# Patient Record
Sex: MALE | Race: WHITE
[De-identification: names, ages, dates, MRNs, and addresses within clinical notes are randomized per-mention and may not be internally consistent; named-entity substitution may affect disease eponyms.]

---
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
## 2018-05-11 ENCOUNTER — HOSPITAL ENCOUNTER (EMERGENCY)
Dept: HOSPITAL 80 - FED | Age: 18
Discharge: HOME | End: 2018-05-11
Payer: COMMERCIAL

## 2018-05-11 VITALS — SYSTOLIC BLOOD PRESSURE: 126 MMHG | DIASTOLIC BLOOD PRESSURE: 80 MMHG
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
## 2018-05-11 DIAGNOSIS — S52.91XA: Primary | ICD-10-CM

## 2018-05-11 DIAGNOSIS — S52.202A: ICD-10-CM

## 2018-05-11 PROCEDURE — A4565 SLINGS: HCPCS

## 2018-05-11 NOTE — EDPHY
H & P


Stated Complaint: fell off the his skateboard right arm injury pt denies 

hitting head





- Personal History


Current Tetanus/Diphtheria Vaccine: Yes


Current Tetanus Diphtheria and Acellular Pertussis (TDAP): Yes





- Medical/Surgical History


Hx Asthma: No


Hx Chronic Respiratory Disease: No


Hx Diabetes: No


Hx Cardiac Disease: No


Hx Renal Disease: No


Hx Cirrhosis: No


Hx Alcoholism: No


Hx HIV/AIDS: No


Hx Splenectomy or Spleen Trauma: No


Other PMH: pmh:none.  psh:none





- Social History


Smoking Status: Never smoked


Time Seen by Provider: 05/11/18 19:45


HPI/ROS: 





Chief complaint:  Right forearm injury





History of present illness:  This is a 17-year-old male who presents to the 

emergency department with his family for a right forearm injury.  Just prior to 

arrival he was skateboarding when he fell off onto his right arm.  Since then 

he has had pain.  He has noticed deformity to the forearm.  It hurts to move 

the digits or wrist.  He denies other associated signs or symptoms including no 

open wounds.  No abnormal coolness or paresthesias in the arm.  No other 

injuries reported. (Ankush Ryan)





- Physical Exam


Exam: 





General:  Alert, nontoxic


Skin:  No open wounds to the right upper extremity


Musculoskeletal:  Tenderness to the mid forearm.  The elbow, wrist and digits 

are nontender.  It hurts to move the digits and wrist.  He can move the elbow.  

The upper arm is unremarkable.


Vascular:  Radial pulses 2+.  Cap refill brisk in all digits of the right hand.


Neurologic:  Sensation intact throughout the right arm. (Ankush Ryan)


Constitutional: 





 Initial Vital Signs











Temperature (C)  36.4 C   05/11/18 19:25


 


Heart Rate  89   05/11/18 19:25


 


Respiratory Rate  16   05/11/18 19:25


 


Blood Pressure  113/86 H  05/11/18 19:25


 


O2 Sat (%)  96   05/11/18 19:25








 











O2 Delivery Mode               Room Air














Allergies/Adverse Reactions: 


 





No Known Allergies Allergy (Verified 05/11/18 19:27)


 








Home Medications: 














 Medication  Instructions  Recorded


 


Hydrocodone/APAP 5/325 [Norco 1 tab PO Q4H #10 tab 05/11/18





5/325 (*)]  














Medical Decision Making





- Diagnostics


Imaging: I viewed and interpreted images myself


Procedures: 





Procedure:  Splint placement.





A sugar-tong splint was applied.  After application of the splint I returned 

and re-examined the patient.  The splint was adequately immobilizing the joint 

and distal to the splint the patient's circulation and sensation was intact.  

He was then placed in a sling.  He remained neurovascularly intact. (Ankush Ryan)


ED Course/Re-evaluation: 





Patient seen under the supervision of my secondary supervising physician Dr. Loli Cueva.  Patient presents for a right forearm injury.  The arm is 

neurovascularly intact.  X-ray confirms a mid shaft radius and ulna fracture.  

It is not open.  I have consulted with Orthopedics Dr. Monique Thorne who has 

reviewed the x-rays.  He is comfortable with patient being placed in a sugar-

tong splint and sling and following up in clinic.  This has occurred.  Pain 

management is discussed.  Home care is discussed.  They are referred to 

Orthopedics for further evaluation and care.  Return precautions are given. (

Ankush Ryan)


Differential Diagnosis: 





Included but not limited to contusion, sprain or strain, bony fracture, joint 

dislocation (Ankush Ryan)


Other Provider: 





The patient was evaluated and managed by the Physician Assistant.  I discussed 

the patient's presentation and course with the midlevel provider with them and 

agree with the evaluation.  My co-signature indicates that I have reviewed this 

chart and I agree with the findings and plan of care as documented.  I am the 

secondary supervising physician. (Loli Cueva)





- Data Points


Medications Given: 





 








Discontinued Medications





Hydrocodone Bitart/Acetaminophen (Norco 5/325)  1 tab PO EDNOW ONE


   Stop: 05/11/18 19:43


   Last Admin: 05/11/18 19:44 Dose:  1 tab


Hydrocodone Bitart/Acetaminophen (Norco 5/325mg Prepack#6)  1 btl TAKEHOME 

EDNOW ONE


   Stop: 05/11/18 20:48


   Last Admin: 05/11/18 21:01 Dose:  1 btl


Ibuprofen (Motrin)  600 mg PO EDNOW ONE


   Stop: 05/11/18 19:43


   Last Admin: 05/11/18 19:45 Dose:  600 mg








Departure





- Departure


Disposition: Home, Routine, Self-Care


Clinical Impression: 


Right forearm fracture


Qualifiers:


 Encounter type: initial encounter Fracture type: closed Qualified Code(s): 

S52.91XA - Unspecified fracture of right forearm, initial encounter for closed 

fracture





Condition: Good


Instructions:  Hydrocodone/Acetaminophen (By mouth), Arm Fracture in Adults (ED)


Additional Instructions: 


Please call on Monday and arrange a follow-up appointment with the orthopedic 

doctor for continued care





In regards to pain control see the following:


 Use ibuprofen [600] mg [3] times a day for the next 2-3 days for pain


 In addition You have been prescribed [Norco] for pain.  [Norco] contains 

Tylenol, do not take extra Tylenol/acetaminophen/Apap with it.  It is sedating.





If symptoms worsen or new symptoms develop return to the emergency room for 

recheck


Referrals: 


NONE *PRIMARY CARE P,. [Primary Care Provider] - As per Instructions


Monique Thorne MD [Medical Doctor] - As per Instructions


Prescriptions: 


Hydrocodone/APAP 5/325 [Norco 5/325 (*)] 1 tab PO Q4H #10 tab

## 2018-05-15 ENCOUNTER — HOSPITAL ENCOUNTER (OUTPATIENT)
Dept: HOSPITAL 80 - FSGY | Age: 18
Discharge: HOME | End: 2018-05-15
Attending: SPECIALIST
Payer: COMMERCIAL

## 2018-05-15 VITALS — DIASTOLIC BLOOD PRESSURE: 89 MMHG | SYSTOLIC BLOOD PRESSURE: 135 MMHG

## 2018-05-15 DIAGNOSIS — S52.251A: ICD-10-CM

## 2018-05-15 DIAGNOSIS — S52.351A: Primary | ICD-10-CM

## 2018-05-15 PROCEDURE — 0PSK04Z REPOSITION RIGHT ULNA WITH INTERNAL FIXATION DEVICE, OPEN APPROACH: ICD-10-PCS | Performed by: SPECIALIST

## 2018-05-15 PROCEDURE — C1713 ANCHOR/SCREW BN/BN,TIS/BN: HCPCS

## 2018-05-15 PROCEDURE — 0PSH04Z REPOSITION RIGHT RADIUS WITH INTERNAL FIXATION DEVICE, OPEN APPROACH: ICD-10-PCS | Performed by: SPECIALIST

## 2018-05-15 RX ADMIN — Medication PRN MCG: at 18:17

## 2018-05-15 RX ADMIN — Medication PRN MCG: at 18:41

## 2018-05-15 RX ADMIN — Medication PRN MCG: at 18:27

## 2018-05-15 NOTE — POSTOPPROG
Post Op Note


Date of Operation: 05/15/18


Surgeon: Dagoberto Baker


Assistant: none


Anesthesiologist: Wyatt Ojeda


Anesthesia: LMA


Pre-op Diagnosis: Right both bone forearm fractures


Post-op Diagnosis: same


Indication: angulated displaced fractures


Procedure: ORIF right both bone forearm fractures


Findings: fractures displaced and angulated


Inf/Abcess present in the surg proc area at time of surgery?: No


Depth: Deep Incisional (Fascial)


EBL: Minimal


Total fluids administered: 600


Complications: 





None

## 2018-05-15 NOTE — PDANEPAE
ANE Past Medical History





- Pulmonary History


Hx Oxygen in Use at Home: No


Hx Sleep Apnea: No





- Endocrine History


Hx Diabetes: No





- Chronic Pain History


Chronic Pain: No





ANE Review of Systems


Review of Systems: 








ANE Patient History





- Allergies


Allergies/Adverse Reactions: 








No Known Allergies Allergy (Verified 05/11/18 19:27)


 








- NPO status


NPO Since - Liquids (Date): 05/15/18


NPO Since - Liquids (Time): 10:00


NPO Since - Solids (Date): 05/15/18


NPO Since - Solids (Time): 07:30





- Smoking Hx


Smoking Status: Never smoked





- Family Anes Hx


Family Anes Hx: neg - N/A





ANE Labs/Vital Signs





- Vital Signs


Blood Pressure: 116/68


Heart Rate: 57


Respiratory Rate: 18


O2 Sat (%): 96


Height: 175.26 cm


Weight: 63.503 kg





ANE Physical Exam





- Airway


Neck exam: FROM


Mallampati Score: Class 1


Mouth exam: normal dental/mouth exam





- Pulmonary


Pulmonary: no respiratory distress, no rales or rhonchi, clear to auscultation





- Cardiovascular


Cardiovascular: regular rate and rhythym, no murmur, rub, or gallop





ANE Anesthesia Plan


Anesthesia Plan: GA w LMA

## 2018-05-15 NOTE — GOP
[f rep st]



                                                                OPERATIVE REPORT





DATE OF OPERATION:  05/15/2018



SURGEON:  Dagoberto Baker MD



PREOPERATIVE DIAGNOSIS:  Both-bone midshaft forearm fracture, right side.



POSTOPERATIVE DIAGNOSIS:  Both-bone midshaft forearm fracture, right side.



PROCEDURE PERFORMED:  Open reduction, internal fixation with plate and screws of right midshaft radiu
s and ulnar fractures.



FINDINGS:  





INDICATIONS:  Displaced angulated comminuted fractures of both bones of the right forearm.



DESCRIPTION OF PROCEDURE:  Under general anesthesia, the patient's right arm was prepped and draped i
n the usual fashion, and with the tourniquet at 250 mmHg, fluoroscopic visualization of the fracture 
sites was used to design the skin incisions, and the ulna was approached first through the subcutaneo
us border of the ulna, and the incision was centered over the fracture site itself.  There was bayone
t apposition of that bone and angulation, and with traction, the ulna was brought back out to length.
  The fracture was de-rotated and then, once the alignment was restored, then, a 6-hole dynamic compr
ession plate was applied to the ulna.  Once screw fixation was in place, the fracture alignment was c
hecked with fluoroscopic visualization.  Then, the screw length as well as fracture alignment looked 
excellent.  The radius, which was more complicated, in that there was comminution and also very signi
ficant angulation.  That was approached through a proximal anterior incision, and the interval betwee
n the finger flexors and flexor carpi radialis was developed.  Radial nerve and radial artery were vi
sualized to assure that they were protected, and then fracture alignment again was corrected, and the
 fracture de-rotated so that anatomic alignment was restored with the fracture lined up correctly.  A
 6-hole plate and screws was applied and the fracture alignment, screw length was checked, and was fe
lt to be excellent.  There was 1 butterfly fragment which was manipulated back into improved position
, and this was on the backside of the radius, and there was some interposed soft tissue.  It was felt
 that dissection of that butterfly fragment out from the soft tissues would create additional soft ti
ssue trauma, and it was felt that was not beneficial.  Both wounds were irrigated profusely with body
 temperature saline.  0.5% plain Marcaine was instilled in each wound.  Median, ulnar, and radial ner
ves were blocked at the level of the elbow for postoperative pain control.  A total of 18 cc of Moy
ine was used.  The fascia was suture approximated using interrupted simple sutures of 2-0 Vicryl, and
 then skin and subcutaneous tissue was closed as a single layer using horizontal mattress sutures of 
4-0 Prolene.  A bulky soft dressing was applied, followed by fiberglass sugar-tong splint held in barb
ce with an Ace bandage that was applied in neutral forearm rotation, and the tourniquet deflation res
ulted in immediate pinking of the digits.  He was brought to the recovery area, where detailed postop
erative instructions were given prior to discharge.  A prescription for Percocet and Keflex had been 
provided preoperatively.  He had been given 2 g of Ancef prior to commencement of surgery.  Followup 
arrangements in the office for about a week postop for dressing and suture removal, and above-elbow c
ast application for 3 additional weeks.





Job #:  [id number]